# Patient Record
Sex: MALE | Race: WHITE | NOT HISPANIC OR LATINO | URBAN - METROPOLITAN AREA
[De-identification: names, ages, dates, MRNs, and addresses within clinical notes are randomized per-mention and may not be internally consistent; named-entity substitution may affect disease eponyms.]

---

## 2020-07-26 ENCOUNTER — EMERGENCY (EMERGENCY)
Facility: HOSPITAL | Age: 25
LOS: 1 days | Discharge: HOME | End: 2020-07-26
Attending: EMERGENCY MEDICINE | Admitting: EMERGENCY MEDICINE
Payer: COMMERCIAL

## 2020-07-26 VITALS
RESPIRATION RATE: 18 BRPM | SYSTOLIC BLOOD PRESSURE: 148 MMHG | HEART RATE: 92 BPM | OXYGEN SATURATION: 97 % | DIASTOLIC BLOOD PRESSURE: 88 MMHG

## 2020-07-26 VITALS
HEART RATE: 97 BPM | WEIGHT: 145.06 LBS | OXYGEN SATURATION: 96 % | SYSTOLIC BLOOD PRESSURE: 139 MMHG | TEMPERATURE: 98 F | RESPIRATION RATE: 18 BRPM | DIASTOLIC BLOOD PRESSURE: 84 MMHG

## 2020-07-26 LAB
AMPHET UR-MCNC: NEGATIVE — SIGNIFICANT CHANGE UP
ANION GAP SERPL CALC-SCNC: 13 MMOL/L — SIGNIFICANT CHANGE UP (ref 5–17)
BARBITURATES UR SCN-MCNC: NEGATIVE — SIGNIFICANT CHANGE UP
BASOPHILS # BLD AUTO: 0.05 K/UL — SIGNIFICANT CHANGE UP (ref 0–0.2)
BASOPHILS NFR BLD AUTO: 0.6 % — SIGNIFICANT CHANGE UP (ref 0–2)
BENZODIAZ UR-MCNC: NEGATIVE — SIGNIFICANT CHANGE UP
BUN SERPL-MCNC: 8 MG/DL — SIGNIFICANT CHANGE UP (ref 7–23)
CALCIUM SERPL-MCNC: 9.9 MG/DL — SIGNIFICANT CHANGE UP (ref 8.4–10.5)
CHLORIDE SERPL-SCNC: 102 MMOL/L — SIGNIFICANT CHANGE UP (ref 96–108)
CO2 SERPL-SCNC: 25 MMOL/L — SIGNIFICANT CHANGE UP (ref 22–31)
COCAINE METAB.OTHER UR-MCNC: NEGATIVE — SIGNIFICANT CHANGE UP
CREAT SERPL-MCNC: 0.69 MG/DL — SIGNIFICANT CHANGE UP (ref 0.5–1.3)
EOSINOPHIL # BLD AUTO: 0.02 K/UL — SIGNIFICANT CHANGE UP (ref 0–0.5)
EOSINOPHIL NFR BLD AUTO: 0.2 % — SIGNIFICANT CHANGE UP (ref 0–6)
GLUCOSE SERPL-MCNC: 124 MG/DL — HIGH (ref 70–99)
HCT VFR BLD CALC: 38.9 % — LOW (ref 39–50)
HGB BLD-MCNC: 13.6 G/DL — SIGNIFICANT CHANGE UP (ref 13–17)
IMM GRANULOCYTES NFR BLD AUTO: 0.3 % — SIGNIFICANT CHANGE UP (ref 0–1.5)
LYMPHOCYTES # BLD AUTO: 1.17 K/UL — SIGNIFICANT CHANGE UP (ref 1–3.3)
LYMPHOCYTES # BLD AUTO: 13.2 % — SIGNIFICANT CHANGE UP (ref 13–44)
MCHC RBC-ENTMCNC: 30.6 PG — SIGNIFICANT CHANGE UP (ref 27–34)
MCHC RBC-ENTMCNC: 35 GM/DL — SIGNIFICANT CHANGE UP (ref 32–36)
MCV RBC AUTO: 87.4 FL — SIGNIFICANT CHANGE UP (ref 80–100)
METHADONE UR-MCNC: NEGATIVE — SIGNIFICANT CHANGE UP
MONOCYTES # BLD AUTO: 0.42 K/UL — SIGNIFICANT CHANGE UP (ref 0–0.9)
MONOCYTES NFR BLD AUTO: 4.7 % — SIGNIFICANT CHANGE UP (ref 2–14)
NEUTROPHILS # BLD AUTO: 7.19 K/UL — SIGNIFICANT CHANGE UP (ref 1.8–7.4)
NEUTROPHILS NFR BLD AUTO: 81 % — HIGH (ref 43–77)
NRBC # BLD: 0 /100 WBCS — SIGNIFICANT CHANGE UP (ref 0–0)
OPIATES UR-MCNC: NEGATIVE — SIGNIFICANT CHANGE UP
PCP SPEC-MCNC: SIGNIFICANT CHANGE UP
PCP UR-MCNC: NEGATIVE — SIGNIFICANT CHANGE UP
PLATELET # BLD AUTO: 231 K/UL — SIGNIFICANT CHANGE UP (ref 150–400)
POTASSIUM SERPL-MCNC: 4.1 MMOL/L — SIGNIFICANT CHANGE UP (ref 3.5–5.3)
POTASSIUM SERPL-SCNC: 4.1 MMOL/L — SIGNIFICANT CHANGE UP (ref 3.5–5.3)
RBC # BLD: 4.45 M/UL — SIGNIFICANT CHANGE UP (ref 4.2–5.8)
RBC # FLD: 11.5 % — SIGNIFICANT CHANGE UP (ref 10.3–14.5)
SODIUM SERPL-SCNC: 140 MMOL/L — SIGNIFICANT CHANGE UP (ref 135–145)
THC UR QL: NEGATIVE — SIGNIFICANT CHANGE UP
TROPONIN T SERPL-MCNC: <0.01 NG/ML — SIGNIFICANT CHANGE UP (ref 0–0.01)
WBC # BLD: 8.88 K/UL — SIGNIFICANT CHANGE UP (ref 3.8–10.5)
WBC # FLD AUTO: 8.88 K/UL — SIGNIFICANT CHANGE UP (ref 3.8–10.5)

## 2020-07-26 PROCEDURE — 84484 ASSAY OF TROPONIN QUANT: CPT

## 2020-07-26 PROCEDURE — 85025 COMPLETE CBC W/AUTO DIFF WBC: CPT

## 2020-07-26 PROCEDURE — 36415 COLL VENOUS BLD VENIPUNCTURE: CPT

## 2020-07-26 PROCEDURE — 99283 EMERGENCY DEPT VISIT LOW MDM: CPT

## 2020-07-26 PROCEDURE — 80048 BASIC METABOLIC PNL TOTAL CA: CPT

## 2020-07-26 PROCEDURE — 80307 DRUG TEST PRSMV CHEM ANLYZR: CPT

## 2020-07-26 PROCEDURE — 99285 EMERGENCY DEPT VISIT HI MDM: CPT

## 2020-07-26 RX ADMIN — Medication 1 MILLIGRAM(S): at 18:51

## 2020-07-26 NOTE — ED ADULT NURSE NOTE - OBJECTIVE STATEMENT
Patient is a 26yo M, arrived to ED via walk-in, AAOx3, in NAD, VSS, complaining of chest tightness since last night.  Patient admits to smoking marijuana yesterday.  Patient denies any dizziness, N/V, fevers, chills or any other complaints at this time.

## 2020-07-26 NOTE — ED PROVIDER NOTE - NSFOLLOWUPINSTRUCTIONS_ED_ALL_ED_FT
Please see your primary care provider in 2-3 days.  Call for appointment.  If you have any problems with followup, please call the ED Referral Coordinator at [phone].  Return to the ER if symptoms worsen or other concerns.    Anxiety    WHAT YOU NEED TO KNOW:    Anxiety is a condition that causes you to feel extremely worried or nervous. The feelings are so strong that they can cause problems with your daily activities or sleep. Anxiety may be triggered by something you fear, or it may happen without a cause. Family or work stress, smoking, caffeine, and alcohol can increase your risk for anxiety. Certain medicines or health conditions can also increase your risk. Anxiety can become a long-term condition if it is not managed or treated.     DISCHARGE INSTRUCTIONS:    Call 911 if:     You have chest pain, tightness, or heaviness that may spread to your shoulders, arms, jaw, neck, or back.      You feel like hurting yourself or someone else.     Contact your healthcare provider if:     Your symptoms get worse or do not get better with treatment.      Your anxiety keeps you from doing your regular daily activities.      You have new symptoms since your last visit.      You have questions or concerns about your condition or care.    Medicines:     Medicines may be given to help you feel more calm and relaxed, and decrease your symptoms.      Take your medicine as directed. Contact your healthcare provider if you think your medicine is not helping or if you have side effects. Tell him of her if you are allergic to any medicine. Keep a list of the medicines, vitamins, and herbs you take. Include the amounts, and when and why you take them. Bring the list or the pill bottles to follow-up visits. Carry your medicine list with you in case of an emergency.    Follow up with your healthcare provider within 2 weeks or as directed: Write down your questions so you remember to ask them during your visits.    Manage anxiety:     Talk to someone about your anxiety. Your healthcare provider may suggest counseling. Cognitive behavioral therapy can help you understand and change how you react to events that trigger your symptoms. You might feel more comfortable talking with a friend or family member about your anxiety. Choose someone you know will be supportive and encouraging.      Find ways to relax. Activities such as exercise, meditation, or listening to music can help you relax. Spend time with friends, or do things you enjoy.      Practice deep breathing. Deep breathing can help you relax when you feel anxious. Focus on taking slow, deep breaths several times a day, or during an anxiety attack. Breathe in through your nose and out through your mouth.       Create a regular sleep routine. Regular sleep can help you feel calmer during the day. Go to sleep and wake up at the same times every day. Do not watch television or use the computer right before bed. Your room should be comfortable, dark, and quiet.       Eat a variety of healthy foods. Healthy foods include fruits, vegetables, low-fat dairy products, lean meats, fish, whole-grain breads, and cooked beans. Healthy foods can help you feel less anxious and have more energy.      Exercise regularly. Exercise can increase your energy level. Exercise may also lift your mood and help you sleep better. Your healthcare provider can help you create an exercise plan.      Do not smoke. Nicotine and other chemicals in cigarettes and cigars can increase anxiety. Ask your healthcare provider for information if you currently smoke and need help to quit. E-cigarettes or smokeless tobacco still contain nicotine. Talk to your healthcare provider before you use these products.       Do not have caffeine. Caffeine can make your symptoms worse. Do not have foods or drinks that are meant to increase your energy level.      Limit or do not drink alcohol. Ask your healthcare provider if alcohol is safe for you. You may not be able to drink alcohol if you take certain anxiety or depression medicines. Limit alcohol to 1 drink per day if you are a woman. Limit alcohol to 2 drinks per day if you are a man. A drink of alcohol is 12 ounces of beer, 5 ounces of wine, or 1½ ounces of liquor.       Do not use drugs. Drugs can make your anxiety worse. It can also make anxiety hard to manage. Talk to your healthcare provider if you use drugs and want help to quit.    Nonspecific Chest Pain  Chest pain can be caused by many different conditions. There is always a chance that your pain could be related to something serious, such as a heart attack or a blood clot in your lungs. Chest pain can also be caused by conditions that are not life-threatening. If you have chest pain, it is very important to follow up with your health care provider.    What are the causes?  Causes of this condition include:    Heartburn.  Pneumonia or bronchitis.  Anxiety or stress.  Inflammation around your heart (pericarditis) or lung (pleuritis or pleurisy).  A blood clot in your lung.  A collapsed lung (pneumothorax). This can develop suddenly on its own (spontaneous pneumothorax) or from trauma to the chest.  Shingles infection (varicella-zoster virus).  Heart attack.  Damage to the bones, muscles, and cartilage that make up your chest wall. This can include:    Bruised bones due to injury.  Strained muscles or cartilage due to frequent or repeated coughing or overwork.  Fracture to one or more ribs.  Sore cartilage due to inflammation (costochondritis).      What increases the risk?  Risk factors for this condition may include:    Activities that increase your risk for trauma or injury to your chest.  Respiratory infections or conditions that cause frequent coughing.  Medical conditions or overeating that can cause heartburn.  Heart disease or family history of heart disease.  Conditions or health behaviors that increase your risk of developing a blood clot.  Having had chicken pox (varicella zoster).    What are the signs or symptoms?  Chest pain can feel like:    Burning or tingling on the surface of your chest or deep in your chest.  Crushing, pressure, aching, or squeezing pain.  Dull or sharp pain that is worse when you move, cough, or take a deep breath.  Pain that is also felt in your back, neck, shoulder, or arm, or pain that spreads to any of these areas.    Your chest pain may come and go, or it may stay constant.    How is this diagnosed?  Lab tests or other studies may be needed to find the cause of your pain. Your health care provider may have you take a test called an ECG (electrocardiogram). An ECG records your heartbeat patterns at the time the test is performed. You may also have other tests, such as:    Transthoracic echocardiogram (TTE). In this test, sound waves are used to create a picture of the heart structures and to look at how blood flows through your heart.  Transesophageal echocardiogram (PACO). This is a more advanced imaging test that takes images from inside your body. It allows your health care provider to see your heart in finer detail.  Cardiac monitoring. This allows your health care provider to monitor your heart rate and rhythm in real time.  Holter monitor. This is a portable device that records your heartbeat and can help to diagnose abnormal heartbeats. It allows your health care provider to track your heart activity for several days, if needed.  Stress tests. These can be done through exercise or by taking medicine that makes your heart beat more quickly.  Blood tests.  Other imaging tests.    How is this treated?  Treatment depends on what is causing your chest pain. Treatment may include:    Medicines. These may include:    Acid blockers for heartburn.  Anti-inflammatory medicine.  Pain medicine for inflammatory conditions.  Antibiotic medicine, if an infection is present.  Medicines to dissolve blood clots.  Medicines to treat coronary artery disease (CAD).    Supportive care for conditions that do not require medicines. This may include:    Resting.  Applying heat or cold packs to injured areas.  Limiting activities until pain decreases.      Follow these instructions at home:  Medicines     If you were prescribed an antibiotic, take it as told by your health care provider. Do not stop taking the antibiotic even if you start to feel better.  Take over-the-counter and prescription medicines only as told by your health care provider.  Lifestyle     Do not use any products that contain nicotine or tobacco, such as cigarettes and e-cigarettes. If you need help quitting, ask your health care provider.  Do not drink alcohol.  ImageMake lifestyle changes as directed by your health care provider. These may include:    Getting regular exercise. Ask your health care provider to suggest some activities that are safe for you.  Eating a heart-healthy diet. A registered dietitian can help you to learn healthy eating options.  Maintaining a healthy weight.  Managing diabetes, if necessary.  Reducing stress, such as with yoga or relaxation techniques.    General instructions     Avoid any activities that bring on chest pain.  If heartburn is the cause for your chest pain, raise (elevate) the head of your bed about 6 inches (15 cm) by putting blocks under the legs. Sleeping with more pillows does not effectively relieve heartburn because it only changes the position of your head.  Keep all follow-up visits as told by your health care provider. This is important. This includes any further testing if your chest pain does not go away.  Contact a health care provider if:  Your chest pain does not go away.  You have a rash with blisters on your chest.  You have a fever.  You have chills.  Get help right away if:  Your chest pain is worse.  You have a cough that gets worse, or you cough up blood.  You have severe pain in your abdomen.  You have severe weakness.  You faint.  You have sudden, unexplained chest discomfort.  You have sudden, unexplained discomfort in your arms, back, neck, or jaw.  You have shortness of breath at any time.  You suddenly start to sweat, or your skin gets clammy.  You feel nauseous or you vomit.  You suddenly feel light-headed or dizzy.  Your heart begins to beat quickly, or it feels like it is skipping beats.  These symptoms may represent a serious problem that is an emergency. Do not wait to see if the symptoms will go away. Get medical help right away. Call your local emergency services (911 in the [place] Do not drive yourself to the hospital.     This information is not intended to replace advice given to you by your health care provider. Make sure you discuss any questions you have with your health care provider.    What You Need to Know About Marijuana Use  Marijuana is a mixture of the dried leaves and flowers of the hemp plant Cannabis sativa. The plant's active ingredients (cannabinoids) change the chemistry of the brain. If you smoke or eat marijuana, you will experience changes in the way you think, feel, and behave.  Many people use marijuana because it helps them relax and puts them in a pleasurable mood (marijuana high). Some people use marijuana for medical effects, such as:  Reduced nausea.Increased appetite.Reduced muscle spasm.Pain relief.Researchers are studying other possible medical uses for marijuana.  How can marijuana use affect me?  Many people find a marijuana high to be pleasurable and relaxing. Other people find a marijuana high to be uncomfortable or anxiety-causing. This drug can cause short-term and long-term physical and mental effects. Taking high doses of marijuana or trying to quit marijuana can also affect you.  Short-term effects of marijuana use include:  Temporary relief of symptoms from a medical condition.Changes in mood and perception (feeling high).Increased hunger.Increased heart rate.Slowed movement and reaction time.Poor memory, judgment, and problem solving ability.Altered sense of time.Changes to vision.Bloodshot eyes.Coughing.Long-term effects of marijuana use include:  Higher risk of lung and breathing problems.Higher risk of heart attack.Higher risk of testicular cancer.Mental and physical dependence (addiction).Slowed brain development in young people. Babies whose mothers used marijuana during pregnancy have an increased risk of problems with brain development and behavior.Temporary periods of false perceptions or beliefs (hallucinations or paranoia).Worsening of mental illness.Onset of new mental illness such as anxiety, depression, or suicidal thoughts.Withdrawal symptoms when stopping marijuana, such as sleeplessness, anxiety, cravings, and anger.Difficulty maintaining healthy relationships.Poor memory, and difficulty concentrating and learning. This can result in decreased intelligence and poor performance at school or work, and an increased risk of dropping out of school.Higher risk of using other substances like alcohol and nicotine.High doses of marijuana can cause:  Panic.Anxiety.Mental confusion.Hallucinations.Quitting marijuana after using it for a long time can cause withdrawal symptoms, such as:  Headache.Shakiness.Sweating.Stomach pain.Nausea.Restlessness.Irritability.Trouble sleeping.Decreased appetite.What are the benefits of not using marijuana?  Not using marijuana can keep you from becoming dependent on it. You can avoid the negative effects of the drug that can reduce your quality of life. You can avoid accidents caused by the slowed reaction time that is common with marijuana use.  If I already use marijuana, what steps can I take to stop using it?  If you are not physically or mentally dependent on marijuana, you should be able to stop using it on your own. If you cannot stop on your own, ask your health care provider for help. Treatment for marijuana addiction is similar to treatment for other addictions. It may include:  Cognitive-behavioral therapy (psychotherapy). This may include individual or group therapy.Joining a support group.Treating medical, behavioral, or mental health conditions that exist along with marijuana dependency.Where can I get more information?  Learn more about:  Marijuana from the [place] National [place] on Drug Abuse: https://www.drugabuse.gov/publications/drugfacts/marijuanaMedical marijuana from the National Institutes of Health: https://nccih.nih.gov/health/marijuanaTreatment options from the Substance Abuse and Mental Health Services Administration: https://findtreatment.samhsa.gov/Recovery from marijuana dependency from Recovery.org: http://www.recovery.org/topics/marijuana-recoveryWhen should I seek medical care?  Talk with your health care provider if:  You want to stop using marijuana but you cannot.You have withdrawal symptoms when you try to stop using marijuana.You are using marijuana every day.You are using marijuana along with other drugs like cocaine or alcohol.You have anxiety or depression.You have hallucinations or paranoia.Marijuana use is interfering with your relationships or your ability to function normally at school or at work.Summary  You may become physically or mentally dependent on marijuana.Long-term use may interfere with your ability to function normally at home, school, or work.Marijuana addiction is treatable.This information is not intended to replace advice given to you by your health care provider. Make sure you discuss any questions you have with your health care provider.

## 2020-07-26 NOTE — ED PROVIDER NOTE - PATIENT PORTAL LINK FT
You can access the FollowMyHealth Patient Portal offered by [facility] by registering at the following website: [email or web address]. By joining [employer]’s FollowMyHealth portal, you will also be able to view your health information using other applications (apps) compatible with our system.

## 2020-07-26 NOTE — ED PROVIDER NOTE - CLINICAL SUMMARY MEDICAL DECISION MAKING FREE TEXT BOX
chest tightness/palpitations after smoking marijuana likely drug induced/ anxiety.  exam non focal. ecg with early repol changes but sent from  for further eval so labs done with normal troponin making acs unlikely. given ativan with improvement. utox neg for other drugs of abuse. to f/u with pmd.  counseled to avoid in future.

## 2020-07-26 NOTE — ED PROVIDER NOTE - OBJECTIVE STATEMENT
here with intermittent chest tightness, palpitations, shortness of breath, anxiety since last night.  Started while smoking marijuana.  Says it lasted about an hour and a half last night then subsided.  Woke up this morning and had his normal cup of coffee and recurred.  Went to urgent care where ecg/cxr were done.  Cxr reportedly normal but ecg had st elevation v4-v5 so told to come to the ED.  No family history of early cad.  Denies other drug use.

## 2020-07-30 DIAGNOSIS — R07.89 OTHER CHEST PAIN: ICD-10-CM

## 2020-07-30 DIAGNOSIS — R00.2 PALPITATIONS: ICD-10-CM

## 2020-07-30 DIAGNOSIS — F41.9 ANXIETY DISORDER, UNSPECIFIED: ICD-10-CM

## 2020-07-30 DIAGNOSIS — R06.02 SHORTNESS OF BREATH: ICD-10-CM

## 2021-12-13 ENCOUNTER — EMERGENCY (EMERGENCY)
Facility: HOSPITAL | Age: 26
LOS: 1 days | Discharge: HOME | End: 2021-12-13
Attending: EMERGENCY MEDICINE | Admitting: EMERGENCY MEDICINE
Payer: COMMERCIAL

## 2021-12-13 VITALS
DIASTOLIC BLOOD PRESSURE: 94 MMHG | SYSTOLIC BLOOD PRESSURE: 147 MMHG | TEMPERATURE: 98 F | RESPIRATION RATE: 18 BRPM | HEART RATE: 67 BPM | WEIGHT: 145.06 LBS | OXYGEN SATURATION: 98 % | HEIGHT: 66 IN

## 2021-12-13 VITALS
HEART RATE: 75 BPM | SYSTOLIC BLOOD PRESSURE: 131 MMHG | OXYGEN SATURATION: 99 % | RESPIRATION RATE: 20 BRPM | DIASTOLIC BLOOD PRESSURE: 72 MMHG

## 2021-12-13 DIAGNOSIS — I48.4 ATYPICAL ATRIAL FLUTTER: ICD-10-CM

## 2021-12-13 DIAGNOSIS — Z86.79 PERSONAL HISTORY OF OTHER DISEASES OF THE CIRCULATORY SYSTEM: ICD-10-CM

## 2021-12-13 DIAGNOSIS — Z20.822 CONTACT WITH AND (SUSPECTED) EXPOSURE TO COVID-19: ICD-10-CM

## 2021-12-13 DIAGNOSIS — I48.91 UNSPECIFIED ATRIAL FIBRILLATION: ICD-10-CM

## 2021-12-13 DIAGNOSIS — R00.2 PALPITATIONS: ICD-10-CM

## 2021-12-13 PROBLEM — Z78.9 OTHER SPECIFIED HEALTH STATUS: Chronic | Status: ACTIVE | Noted: 2020-07-26

## 2021-12-13 LAB
AMPHET UR-MCNC: NEGATIVE — SIGNIFICANT CHANGE UP
ANION GAP SERPL CALC-SCNC: 10 MMOL/L — SIGNIFICANT CHANGE UP (ref 5–17)
APPEARANCE UR: CLEAR — SIGNIFICANT CHANGE UP
APTT BLD: 34.5 SEC — SIGNIFICANT CHANGE UP (ref 27.5–35.5)
BACTERIA # UR AUTO: PRESENT /HPF
BARBITURATES UR SCN-MCNC: NEGATIVE — SIGNIFICANT CHANGE UP
BASOPHILS # BLD AUTO: 0.07 K/UL — SIGNIFICANT CHANGE UP (ref 0–0.2)
BASOPHILS NFR BLD AUTO: 0.9 % — SIGNIFICANT CHANGE UP (ref 0–2)
BENZODIAZ UR-MCNC: NEGATIVE — SIGNIFICANT CHANGE UP
BILIRUB UR-MCNC: NEGATIVE — SIGNIFICANT CHANGE UP
BUN SERPL-MCNC: 14 MG/DL — SIGNIFICANT CHANGE UP (ref 7–23)
CALCIUM SERPL-MCNC: 9.6 MG/DL — SIGNIFICANT CHANGE UP (ref 8.4–10.5)
CHLORIDE SERPL-SCNC: 101 MMOL/L — SIGNIFICANT CHANGE UP (ref 96–108)
CO2 SERPL-SCNC: 27 MMOL/L — SIGNIFICANT CHANGE UP (ref 22–31)
COCAINE METAB.OTHER UR-MCNC: NEGATIVE — SIGNIFICANT CHANGE UP
COLOR SPEC: YELLOW — SIGNIFICANT CHANGE UP
CREAT SERPL-MCNC: 0.74 MG/DL — SIGNIFICANT CHANGE UP (ref 0.5–1.3)
DIFF PNL FLD: NEGATIVE — SIGNIFICANT CHANGE UP
EOSINOPHIL # BLD AUTO: 0.02 K/UL — SIGNIFICANT CHANGE UP (ref 0–0.5)
EOSINOPHIL NFR BLD AUTO: 0.2 % — SIGNIFICANT CHANGE UP (ref 0–6)
EPI CELLS # UR: SIGNIFICANT CHANGE UP /HPF (ref 0–5)
GLUCOSE SERPL-MCNC: 115 MG/DL — HIGH (ref 70–99)
GLUCOSE UR QL: NEGATIVE — SIGNIFICANT CHANGE UP
HCT VFR BLD CALC: 43.4 % — SIGNIFICANT CHANGE UP (ref 39–50)
HGB BLD-MCNC: 14.3 G/DL — SIGNIFICANT CHANGE UP (ref 13–17)
IMM GRANULOCYTES NFR BLD AUTO: 0.2 % — SIGNIFICANT CHANGE UP (ref 0–1.5)
INR BLD: 1.05 — SIGNIFICANT CHANGE UP (ref 0.88–1.16)
KETONES UR-MCNC: NEGATIVE — SIGNIFICANT CHANGE UP
LEUKOCYTE ESTERASE UR-ACNC: NEGATIVE — SIGNIFICANT CHANGE UP
LYMPHOCYTES # BLD AUTO: 1.08 K/UL — SIGNIFICANT CHANGE UP (ref 1–3.3)
LYMPHOCYTES # BLD AUTO: 13.3 % — SIGNIFICANT CHANGE UP (ref 13–44)
MAGNESIUM SERPL-MCNC: 1.8 MG/DL — SIGNIFICANT CHANGE UP (ref 1.6–2.6)
MCHC RBC-ENTMCNC: 29.3 PG — SIGNIFICANT CHANGE UP (ref 27–34)
MCHC RBC-ENTMCNC: 32.9 GM/DL — SIGNIFICANT CHANGE UP (ref 32–36)
MCV RBC AUTO: 88.9 FL — SIGNIFICANT CHANGE UP (ref 80–100)
METHADONE UR-MCNC: NEGATIVE — SIGNIFICANT CHANGE UP
MONOCYTES # BLD AUTO: 0.39 K/UL — SIGNIFICANT CHANGE UP (ref 0–0.9)
MONOCYTES NFR BLD AUTO: 4.8 % — SIGNIFICANT CHANGE UP (ref 2–14)
NEUTROPHILS # BLD AUTO: 6.52 K/UL — SIGNIFICANT CHANGE UP (ref 1.8–7.4)
NEUTROPHILS NFR BLD AUTO: 80.6 % — HIGH (ref 43–77)
NITRITE UR-MCNC: NEGATIVE — SIGNIFICANT CHANGE UP
NRBC # BLD: 0 /100 WBCS — SIGNIFICANT CHANGE UP (ref 0–0)
OPIATES UR-MCNC: NEGATIVE — SIGNIFICANT CHANGE UP
PCP SPEC-MCNC: SIGNIFICANT CHANGE UP
PCP UR-MCNC: NEGATIVE — SIGNIFICANT CHANGE UP
PH UR: 6 — SIGNIFICANT CHANGE UP (ref 5–8)
PHOSPHATE SERPL-MCNC: 3.4 MG/DL — SIGNIFICANT CHANGE UP (ref 2.5–4.5)
PLATELET # BLD AUTO: 255 K/UL — SIGNIFICANT CHANGE UP (ref 150–400)
POTASSIUM SERPL-MCNC: 4.6 MMOL/L — SIGNIFICANT CHANGE UP (ref 3.5–5.3)
POTASSIUM SERPL-SCNC: 4.6 MMOL/L — SIGNIFICANT CHANGE UP (ref 3.5–5.3)
PROT UR-MCNC: ABNORMAL MG/DL
PROTHROM AB SERPL-ACNC: 12.6 SEC — SIGNIFICANT CHANGE UP (ref 10.6–13.6)
RBC # BLD: 4.88 M/UL — SIGNIFICANT CHANGE UP (ref 4.2–5.8)
RBC # FLD: 11.9 % — SIGNIFICANT CHANGE UP (ref 10.3–14.5)
RBC CASTS # UR COMP ASSIST: < 5 /HPF — SIGNIFICANT CHANGE UP
SARS-COV-2 RNA SPEC QL NAA+PROBE: NEGATIVE — SIGNIFICANT CHANGE UP
SODIUM SERPL-SCNC: 138 MMOL/L — SIGNIFICANT CHANGE UP (ref 135–145)
SP GR SPEC: 1.01 — SIGNIFICANT CHANGE UP (ref 1–1.03)
THC UR QL: NEGATIVE — SIGNIFICANT CHANGE UP
TROPONIN T SERPL-MCNC: 0.01 NG/ML — SIGNIFICANT CHANGE UP (ref 0–0.01)
TSH SERPL-MCNC: 1.15 UIU/ML — SIGNIFICANT CHANGE UP (ref 0.27–4.2)
UROBILINOGEN FLD QL: 0.2 E.U./DL — SIGNIFICANT CHANGE UP
WBC # BLD: 8.1 K/UL — SIGNIFICANT CHANGE UP (ref 3.8–10.5)
WBC # FLD AUTO: 8.1 K/UL — SIGNIFICANT CHANGE UP (ref 3.8–10.5)
WBC UR QL: < 5 /HPF — SIGNIFICANT CHANGE UP

## 2021-12-13 PROCEDURE — 85610 PROTHROMBIN TIME: CPT

## 2021-12-13 PROCEDURE — 84100 ASSAY OF PHOSPHORUS: CPT

## 2021-12-13 PROCEDURE — 81001 URINALYSIS AUTO W/SCOPE: CPT

## 2021-12-13 PROCEDURE — 93308 TTE F-UP OR LMTD: CPT

## 2021-12-13 PROCEDURE — 93308 TTE F-UP OR LMTD: CPT | Mod: 26

## 2021-12-13 PROCEDURE — 71045 X-RAY EXAM CHEST 1 VIEW: CPT | Mod: 26

## 2021-12-13 PROCEDURE — 84484 ASSAY OF TROPONIN QUANT: CPT

## 2021-12-13 PROCEDURE — 71045 X-RAY EXAM CHEST 1 VIEW: CPT

## 2021-12-13 PROCEDURE — 87635 SARS-COV-2 COVID-19 AMP PRB: CPT

## 2021-12-13 PROCEDURE — 85730 THROMBOPLASTIN TIME PARTIAL: CPT

## 2021-12-13 PROCEDURE — 99284 EMERGENCY DEPT VISIT MOD MDM: CPT | Mod: 25

## 2021-12-13 PROCEDURE — 93005 ELECTROCARDIOGRAM TRACING: CPT

## 2021-12-13 PROCEDURE — 80048 BASIC METABOLIC PNL TOTAL CA: CPT

## 2021-12-13 PROCEDURE — 85025 COMPLETE CBC W/AUTO DIFF WBC: CPT

## 2021-12-13 PROCEDURE — 36415 COLL VENOUS BLD VENIPUNCTURE: CPT

## 2021-12-13 PROCEDURE — 83735 ASSAY OF MAGNESIUM: CPT

## 2021-12-13 PROCEDURE — 84443 ASSAY THYROID STIM HORMONE: CPT

## 2021-12-13 PROCEDURE — 99285 EMERGENCY DEPT VISIT HI MDM: CPT | Mod: 25

## 2021-12-13 PROCEDURE — 80307 DRUG TEST PRSMV CHEM ANLYZR: CPT

## 2021-12-13 PROCEDURE — 93010 ELECTROCARDIOGRAM REPORT: CPT | Mod: 76

## 2021-12-13 RX ORDER — SODIUM CHLORIDE 9 MG/ML
1000 INJECTION INTRAMUSCULAR; INTRAVENOUS; SUBCUTANEOUS ONCE
Refills: 0 | Status: COMPLETED | OUTPATIENT
Start: 2021-12-13 | End: 2021-12-13

## 2021-12-13 RX ADMIN — SODIUM CHLORIDE 1000 MILLILITER(S): 9 INJECTION INTRAMUSCULAR; INTRAVENOUS; SUBCUTANEOUS at 14:40

## 2021-12-13 NOTE — ED PROVIDER NOTE - CARE PROVIDER_API CALL
Luis Benjamin (MD)  Cardiac Electrophysiology; Cardiovascular Disease; Internal Medicine  [address] [place] [address]  Phone: [phone]  Fax: [phone]  Follow Up Time:

## 2021-12-13 NOTE — ED PROVIDER NOTE - PHYSICAL EXAMINATION
Constitutional: Well appearing, awake, alert, oriented to person, place, time/situation and in no apparent distress.  ENMT: Airway patent. Normal MM  Eyes: Clear bilaterally  Cardiac: Normal rate, irregularly irregular rhythm.  Heart sounds S1, S2.  No murmurs, rubs or gallops.  Respiratory: Breaths sounds equal and clear b/l. No increased WOB, tachypnea, hypoxia, or accessory mm use. Pt speaks in full sentences.   Musculoskeletal: Range of motion is not limited. No LE edema.   Neuro: Alert and oriented x 3, face symmetric and speech fluent. Strength 5/5 x 4 ext and symmetric, nml gross motor movement, nml gait. No focal deficits noted.  Skin: Skin normal color for race, warm, dry and intact. No evidence of rash.  Psych: Alert and oriented to person, place, time/situation. normal mood and affect. no apparent risk to self or others.

## 2021-12-13 NOTE — ED ADULT NURSE NOTE - CHPI ED NUR SYMPTOMS NEG
no back pain/no chest pain/no chills/no congestion/no diaphoresis/no dizziness/no fever/no nausea/no vomiting

## 2021-12-13 NOTE — CONSULT NOTE ADULT - SUBJECTIVE AND OBJECTIVE BOX
HPI:    Mr. Moore is a 25 yo with anxiety and h/o pericarditis thought to be related to viral infection (tx'ed with colchicine), repeat echo June 2020 normal as per pt who presents to ED with c/o palpitations and weakness. EP consulted for possible rate controlled aflutter.    Briefly, pt noted to be in usual state of health this AM. He finsihed a smoothie and some breakfast and noted to have abrupt palpitations. He thought that it could have been his anxiety but palpitations persisted and he felt general weakness. Since he had never experienced palpitations before, he went to the ED. He is vaccinated for covid (no recent vaccinations). No dizziness or lightheadedness. No h/o arrthymmias in the past. No chest pain.    He endorses that the day before he had binged on beers with friends at a party 6-7 beers. He normally drinks 16 oz ocffee with a shot of expresso daily, sometimes two. He endorses recent emotional stress, as his mother was recently diagnosed with lung CA.    During my exam, I noted the telemetry to show Afib 80-90s. He is anxious and emotional about his current situation. He spontaneously self- converted to NSR rate of 70s on monitor. Review of his EKgs done upon arrival to ED show Afib 80s. No medications have been given in the ED. POCUS done at [facility] and noted to be normal by ED attending on service. CHADSVASC noted to be 0.    PAST MEDICAL & SURGICAL HISTORY:  No pertinent past medical history    No significant past surgical history    Social History:   no smoking, no drugs  5-7 beers on weekend; binges  coffee 16 oz w/ expresso maybe 2   significant amount of emotional stress at home (mother was recently diagnosed with lung CA)  works long hours as a .    Home medications:  none    Inpatient Medications:   none    Allergies: NKDA    ROS:   CONSTITUTIONAL: No fever, weight loss, fatigue  EYES: Pt denies  RESPIRATORY: No cough, wheezing, chills or hemoptysis; No Shortness of Breath  CARDIOVASCULAR: see HPI +palpitations  GASTROINTESTINAL: Pt denies  NEUROLOGICAL: Pt denies  SKIN: Pt denies   PSYCHIATRIC: Pt denies  HEME/LYMPH: Pt denies    PHYSICAL:  T(C): 36.9 (12-13-21 @ 15:35), Max: 36.9 (12-13-21 @ 15:35)  HR: 75 (12-13-21 @ 16:38) (67 - 82)  BP: 131/72 (12-13-21 @ 16:38) (131/72 - 147/94)  RR: 20 (12-13-21 @ 16:38) (18 - 20)  SpO2: 99% (12-13-21 @ 16:38) (98% - 99%)    Appearance: No acute distress, well developed  Eyes: normal appearing conjunctiva, pupils and eyelids  Cardiovascular: Normal S1 S2, No JVD, No murmurs, No edema  Respiratory: Lungs clear to auscultation	bilaterally.  No wheeze, rhonchi, rales noted  Gastrointestinal:  Soft, NT/ND 	  Neurologic:  No deficit noted  Psych: A&Ox3, normal mood/affect  Musculoskeletal: normal gait  Skin: no rash noted, normal color and pigmentation.        LABS:                        14.3   8.10  )-----------( 255      ( 13 Dec 2021 14:46 )             43.4     12-13    138  |  101  |  14  ----------------------------<  115<H>  4.6   |  27  |  0.74    Ca    9.6      13 Dec 2021 14:46  Phos  3.4     12-13  Mg     1.8     12-13      PT/INR - ( 13 Dec 2021 14:46 )   PT: 12.6 sec;   INR: 1.05     PTT - ( 13 Dec 2021 14:46 )  PTT:34.5 sec    EKG: Afib @ 84bpm    Telemetry: Afib 80-90s---> NSR 70s    ECHO:  As per pt, last echo was June of last year which was normal.    Prior EP procedures: none    Cath / stress / Cardiac CTa: none    Assessment & Plan:    Mr. Moore is a 25 yo with anxiety and h/o pericarditis thought to be related to viral infection (tx'ed with colchicine), repeat echo June 2020 normal as per pt who presents to ED with c/o palpitations and weakness. EP consulted. pt found to be in rate controlled Afib, now self-converted to NSR.    - pt to follow up with EP Dr. Benjamin  - Mae 0     HPI:    Mr. Moore is a 27 yo with anxiety and h/o pericarditis thought to be related to viral infection (tx'ed with colchicine), repeat echo June 2020 normal as per pt who presents to ED with c/o palpitations and weakness. EP consulted for possible rate controlled aflutter.    Briefly, pt noted to be in usual state of health this AM. He finsihed a smoothie and some breakfast and noted to have abrupt palpitations. He thought that it could have been his anxiety but palpitations persisted and he felt general weakness. Since he had never experienced palpitations before, he went to the ED. He is vaccinated for covid (no recent vaccinations). No dizziness or lightheadedness. No h/o arrthymmias in the past. No chest pain.    He endorses that the day before he had binged on beers with friends at a party 6-7 beers. He normally drinks 16 oz ocffee with a shot of expresso daily, sometimes two. He endorses recent emotional stress, as his mother was recently diagnosed with lung CA.    During my exam, I noted the telemetry to show Afib 80-90s. He is anxious and emotional about his current situation. He spontaneously self- converted to NSR rate of 70s on monitor. Review of his EKgs done upon arrival to ED show Afib 80s. No medications have been given in the ED. POCUS done at [facility] and noted to be normal by ED attending on service. CHADSVASC noted to be 0.    PAST MEDICAL & SURGICAL HISTORY:  No pertinent past medical history    No significant past surgical history    Social History:   no smoking, no drugs  5-7 beers on weekend; binges  coffee 16 oz w/ expresso maybe 2   significant amount of emotional stress at home (mother was recently diagnosed with lung CA)  works long hours as a .    Home medications:  none    Inpatient Medications:   none    Allergies: NKDA    ROS:   CONSTITUTIONAL: No fever, weight loss, fatigue  EYES: Pt denies  RESPIRATORY: No cough, wheezing, chills or hemoptysis; No Shortness of Breath  CARDIOVASCULAR: see HPI +palpitations  GASTROINTESTINAL: Pt denies  NEUROLOGICAL: Pt denies  SKIN: Pt denies   PSYCHIATRIC: Pt denies  HEME/LYMPH: Pt denies    PHYSICAL:  T(C): 36.9 (12-13-21 @ 15:35), Max: 36.9 (12-13-21 @ 15:35)  HR: 75 (12-13-21 @ 16:38) (67 - 82)  BP: 131/72 (12-13-21 @ 16:38) (131/72 - 147/94)  RR: 20 (12-13-21 @ 16:38) (18 - 20)  SpO2: 99% (12-13-21 @ 16:38) (98% - 99%)    Appearance: No acute distress, well developed  Eyes: normal appearing conjunctiva, pupils and eyelids  Cardiovascular: Normal S1 S2, No JVD, No murmurs, No edema  Respiratory: Lungs clear to auscultation	bilaterally.  No wheeze, rhonchi, rales noted  Gastrointestinal:  Soft, NT/ND 	  Neurologic:  No deficit noted  Psych: A&Ox3, normal mood/affect  Musculoskeletal: normal gait  Skin: no rash noted, normal color and pigmentation.        LABS:                        14.3   8.10  )-----------( 255      ( 13 Dec 2021 14:46 )             43.4     12-13    138  |  101  |  14  ----------------------------<  115<H>  4.6   |  27  |  0.74    Ca    9.6      13 Dec 2021 14:46  Phos  3.4     12-13  Mg     1.8     12-13      PT/INR - ( 13 Dec 2021 14:46 )   PT: 12.6 sec;   INR: 1.05     PTT - ( 13 Dec 2021 14:46 )  PTT:34.5 sec    EKG: Afib @ 84bpm    Telemetry: Afib 80-90s---> NSR 70s    ECHO:  As per pt, last echo was June of last year which was normal.    Prior EP procedures: none    Cath / stress / Cardiac CTa: none    Assessment & Plan:    Mr. Moore is a 27 yo with anxiety and h/o pericarditis thought to be related to viral infection (tx'ed with colchicine), repeat echo June 2020 normal as per pt who presents to ED with c/o palpitations and weakness. EP consulted. pt found to be in rate controlled Afib, now self-converted to NSR.    - pt to follow up with EP Dr. Benjamin  - Mae 0  - advised to cut down on ETOH and caffeine intake

## 2021-12-13 NOTE — ED PROVIDER NOTE - OBJECTIVE STATEMENT
Pt w/ PMHx anxiety, pericarditis, no PSHx p/w palpitations, onset this afternoon s/p drinking a protein shake which he's had before. He reports irregular beating, mild SOB. No CP, wheezing, dysphonia, rash, n/v. He drank coffee this morning. He admits to drinking alcohol over the weekend. Denies drug use. Reports hx anxiety, did not initially feel anxious, but felt anxious when sx started. Sx are improved but still present on exam. No recent f/c, URI, GI, or  sx. + 2 doses COVID19 vaccine, last a few months ago. No current infectious sx. No known FHx cardiac disease.

## 2021-12-13 NOTE — ED PROVIDER NOTE - PATIENT PORTAL LINK FT
You can access the FollowMyHealth Patient Portal offered by [facility] by registering at the following website: [email or web address]. By joining [facility]’s FollowMyHealth portal, you will also be able to view your health information using other applications (apps) compatible with our system.

## 2021-12-13 NOTE — ED PROVIDER NOTE - CLINICAL SUMMARY MEDICAL DECISION MAKING FREE TEXT BOX
Pt p/w new onset aflutter w/ variable block. Young, healthy, low [id number] score, not requiring AC. Rate controlled. ? holiday heart. Monitor in ED, IVF, r/o underlying electrolyte abnormalities, dehydration, anemia, other pathology. EP consult. Dispo pending w/u and clinical status.

## 2021-12-13 NOTE — ED ADULT TRIAGE NOTE - CHIEF COMPLAINT QUOTE
had a protien smoothie x 1 hr PTA . reports having chest tightness and an "irregular heart beat." no hives/ rash noted. no n/v/d. denies SOB. airway patent, breathing unlabored. no drooling noted

## 2021-12-13 NOTE — ED ADULT NURSE NOTE - OBJECTIVE STATEMENT
26y M, A&ox3, no medical hx, presents to ed for palpitations. Reports had preworkout 16oz drink then had episode of palpitations with some mild sob. PT is speaking in full complete sentences. No cp, no fevers nor chills. Pt is speaking in full complete sentences. Denies any medical hx nor cardiac hx in family. EKG performed noted aflutter, brought from south side to north side acute 5 and placed on cardiac monitor. PT remains in stable condition at this time.

## 2021-12-13 NOTE — ED PROVIDER NOTE - PROGRESS NOTE DETAILS
EP consulted and will see the pt Pt seen and examined in the ED by KEVIN Pt seen and examined in the ED by EP. Pt converted to NSR during EP exam. Pt has remained in NSR w/o sx. Pt to f/u w/ Dr Benjamin this week. No meds.

## 2021-12-13 NOTE — ED PROVIDER NOTE - NSFOLLOWUPINSTRUCTIONS_ED_ALL_ED_FT
You were evaluated in the ED for palpitations. You were found in abnormal heart rhythm called atrial flutter. You received IV fluids. You converted to a normal sinus rhythm without any other intervention. Your blood work, repeat EKG, chest xray, echocardiogram, and urine studies were within normal limits.     PLEASE FOLLOW UP WITH EP DR MONTANO THIS WEEK. CALL THE OFFICE IN THE MORNING FOR AN APPOINTMENT. IF YOU HAVE RECURRENT SYMPTOMS WITH ELEVATED HEART RATES > 120, OR ASSOCIATED SYMPTOMS OF CHEST, PAIN SHORTNESS OF BREATH, OR FEELING FAINT, RETURN TO THE ED FOR RE-EVALUATION.     Atrial Flutter       Atrial flutter is a type of abnormal heart rhythm (arrhythmia). The heart has an electrical system that tells it how to beat. In atrial flutter, the signals move rapidly in the top chambers of the heart (the atria). This makes your heart beat very fast. Atrial flutter can come and go, or it can be permanent.    The goal of treatment is to prevent blood clots from forming, control your heart rate, or restore your heartbeat to a normal rhythm. If this condition is not treated, it can cause serious problems, such as a weakened heart muscle (cardiomyopathy) or a stroke.      What are the causes?  This condition is often caused by conditions that damage the heart's electrical system. These include:  •Heart conditions and heart surgery. These include heart attacks and open-heart surgery.      •Lung problems, such as COPD or a blood clot in the lung (pulmonary embolism, or PE).      •Poorly controlled high blood pressure (hypertension).      •Overactive thyroid (hyperthyroidism).      •Diabetes.      In some cases, the cause of this condition is not known.      What increases the risk?  You are more likely to develop this condition if:  •You are an elderly adult.      •You are a man.      •You are overweight (obese).      •You have obstructive sleep apnea.      •You have a family history of atrial flutter.      •You have diabetes.      •You drink a lot of alcohol, especially binge drinking.      •You use drugs, including cannabis.      •You smoke.        What are the signs or symptoms?  Symptoms of this condition include:  •A feeling that your heart is pounding or racing (palpitations).      •Shortness of breath.      •Chest pain.      •Feeling dizzy or light-headed.      •Fainting.      •Low blood pressure (hypotension).      •Fatigue.      •Tiring easily during exercise or activity.      In some cases, there are no symptoms.      How is this diagnosed?  This condition may be diagnosed with:  •An electrocardiogram (ECG) to check electrical signals of the heart.      •An ambulatory cardiac monitor to record your heart's activity for a few days.      •An echocardiogram to create pictures of your heart.      •A transesophageal echocardiogram (PACO) to create even better pictures of your heart.      •A stress test to check your blood supply while you exercise.      •Imaging tests, such as a CT scan or chest X-ray.      •Blood tests.        How is this treated?  Treatment depends on underlying conditions and how you feel when you experience atrial flutter. This condition may be treated with:  •Medicines to prevent blood clots or to treat heart rate or heart rhythm problems.      •Electrical cardioversion to reset the heart's rhythm.      •Ablation to remove the heart tissue that sends abnormal signals.      •Left atrial appendage closure to seal the area where blood clots can form.      In some cases, underlying conditions will be treated.      Follow these instructions at home:    Medicines     •Take over-the-counter and prescription medicines only as told by your health care provider.      • Do not take any new medicines without talking to your health care provider.    •If you are taking blood thinners:  •Talk with your health care provider before you take any medicines that contain aspirin or NSAIDs, such as ibuprofen. These medicines increase your risk for dangerous bleeding.      •Take your medicine exactly as told, at the same time every day.      •Avoid activities that could cause injury or bruising, and follow instructions about how to prevent falls.      •Wear a medical alert bracelet or carry a card that lists what medicines you take.        Lifestyle     •Eat heart-healthy foods. Talk with a dietitian to make an eating plan that is right for you.      • Do not use any products that contain nicotine or tobacco, such as cigarettes, e-cigarettes, and chewing tobacco. If you need help quitting, ask your health care provider.      • Do not drink alcohol.      • Do not use drugs, including cannabis.      •Lose weight if you are overweight or obese.      •Exercise regularly as instructed by your health care provider.      General instructions     • Do not use diet pills unless your health care provider approves. Diet pills may make heart problems worse.      •If you have obstructive sleep apnea, manage your condition as told by your health care provider.      •Keep all follow-up visits as told by your health care provider. This is important.        Contact a health care provider if you:    •Notice a change in the rate, rhythm, or strength of your heartbeat.      •Are taking a blood thinner and you notice more bruising.      •Have a sudden change in weight.      •Tire more easily when you exercise or do heavy work.        Get help right away if you have:    •Pain or pressure in your chest.       •Shortness of breath.      •Fainting.      •Increasing sweating with no known cause.      •Side effects of blood thinners, such as blood in your vomit, stool, or urine, or bleeding that cannot stop.    •Any symptoms of a stroke. "BE FAST" is an easy way to remember the main warning signs of a stroke:  •B - Balance. Signs are dizziness, sudden trouble walking, or loss of balance.      •E - Eyes. Signs are trouble seeing or a sudden change in vision.      •F - Face. Signs are sudden weakness or numbness of the face, or the face or eyelid drooping on one side.      •A - Arms. Signs are weakness or numbness in an arm. This happens suddenly and usually on one side of the body.      •S - Speech. Signs are sudden trouble speaking, slurred speech, or trouble understanding what people say.      •T - Time. Time to call emergency services. Write down what time symptoms started.      •Other signs of a stroke, such as:  • A sudden, severe headache with no known cause.      • Nausea or vomiting.      • Seizure.        • These symptoms may represent a serious problem that is an emergency. Do not wait to see if the symptoms will go away. Get medical help right away. Call your local emergency services (911 in the [place] Do not drive yourself to the hospital.         Summary    •Atrial flutter is an abnormal heart rhythm that can give you symptoms of palpitations, shortness of breath, or fatigue.       •Atrial flutter is often treated with medicines to keep your heart in a normal rhythm and to prevent a stroke.      •Get help right away if you cannot catch your breath, or have chest pain or pressure.      •Get help right away if you have signs or symptoms of a stroke.      This information is not intended to replace advice given to you by your health care provider. Make sure you discuss any questions you have with your health care provider.

## 2022-03-21 NOTE — ED ADULT NURSE NOTE - NSSUHOSCREENINGYN_ED_ALL_ED
Recorded Pressure: LVEDP, HR=57, Condition=Condition 1 (Left Ventricle-EDP) LVEDP 146/-2/12 Yes - the patient is able to be screened

## 2024-10-28 NOTE — ED ADULT TRIAGE NOTE - TEMPERATURE IN CELSIUS (DEGREES C)
36.8 Detail Level: Detailed Quality 226: Preventive Care And Screening: Tobacco Use: Screening And Cessation Intervention: Patient screened for tobacco use and is an ex/non-smoker